# Patient Record
Sex: MALE | Race: BLACK OR AFRICAN AMERICAN | NOT HISPANIC OR LATINO | Employment: UNEMPLOYED | URBAN - METROPOLITAN AREA
[De-identification: names, ages, dates, MRNs, and addresses within clinical notes are randomized per-mention and may not be internally consistent; named-entity substitution may affect disease eponyms.]

---

## 2024-01-30 ENCOUNTER — PHARMACY VISIT (OUTPATIENT)
Dept: PHARMACY | Facility: CLINIC | Age: 39
End: 2024-01-30
Payer: COMMERCIAL

## 2024-01-30 ENCOUNTER — HOSPITAL ENCOUNTER (EMERGENCY)
Facility: HOSPITAL | Age: 39
Discharge: HOME | End: 2024-01-30
Attending: EMERGENCY MEDICINE
Payer: COMMERCIAL

## 2024-01-30 VITALS
BODY MASS INDEX: 22.4 KG/M2 | RESPIRATION RATE: 16 BRPM | DIASTOLIC BLOOD PRESSURE: 79 MMHG | OXYGEN SATURATION: 100 % | WEIGHT: 169 LBS | TEMPERATURE: 98.6 F | HEIGHT: 73 IN | SYSTOLIC BLOOD PRESSURE: 108 MMHG | HEART RATE: 80 BPM

## 2024-01-30 DIAGNOSIS — M54.9 BACK PAIN, UNSPECIFIED BACK LOCATION, UNSPECIFIED BACK PAIN LATERALITY, UNSPECIFIED CHRONICITY: Primary | ICD-10-CM

## 2024-01-30 PROCEDURE — RXMED WILLOW AMBULATORY MEDICATION CHARGE

## 2024-01-30 PROCEDURE — 2500000001 HC RX 250 WO HCPCS SELF ADMINISTERED DRUGS (ALT 637 FOR MEDICARE OP): Performed by: STUDENT IN AN ORGANIZED HEALTH CARE EDUCATION/TRAINING PROGRAM

## 2024-01-30 PROCEDURE — 99284 EMERGENCY DEPT VISIT MOD MDM: CPT | Performed by: EMERGENCY MEDICINE

## 2024-01-30 PROCEDURE — 99283 EMERGENCY DEPT VISIT LOW MDM: CPT | Performed by: EMERGENCY MEDICINE

## 2024-01-30 PROCEDURE — 2500000005 HC RX 250 GENERAL PHARMACY W/O HCPCS: Performed by: STUDENT IN AN ORGANIZED HEALTH CARE EDUCATION/TRAINING PROGRAM

## 2024-01-30 RX ORDER — METHOCARBAMOL 500 MG/1
500 TABLET, FILM COATED ORAL ONCE
Status: COMPLETED | OUTPATIENT
Start: 2024-01-30 | End: 2024-01-30

## 2024-01-30 RX ORDER — IBUPROFEN 600 MG/1
600 TABLET ORAL EVERY 6 HOURS PRN
Qty: 30 TABLET | Refills: 0 | Status: SHIPPED | OUTPATIENT
Start: 2024-01-30

## 2024-01-30 RX ORDER — ACETAMINOPHEN 325 MG/1
975 TABLET ORAL ONCE
Status: COMPLETED | OUTPATIENT
Start: 2024-01-30 | End: 2024-01-30

## 2024-01-30 RX ORDER — LIDOCAINE 560 MG/1
1 PATCH PERCUTANEOUS; TOPICAL; TRANSDERMAL ONCE
Status: DISCONTINUED | OUTPATIENT
Start: 2024-01-30 | End: 2024-01-30 | Stop reason: HOSPADM

## 2024-01-30 RX ORDER — IBUPROFEN 600 MG/1
600 TABLET ORAL ONCE
Status: COMPLETED | OUTPATIENT
Start: 2024-01-30 | End: 2024-01-30

## 2024-01-30 RX ORDER — ACETAMINOPHEN 325 MG/1
650 TABLET ORAL EVERY 6 HOURS PRN
Qty: 30 TABLET | Refills: 0 | Status: SHIPPED | OUTPATIENT
Start: 2024-01-30

## 2024-01-30 RX ADMIN — METHOCARBAMOL 500 MG: 500 TABLET ORAL at 11:56

## 2024-01-30 RX ADMIN — LIDOCAINE 1 PATCH: 4 PATCH TOPICAL at 11:43

## 2024-01-30 RX ADMIN — ACETAMINOPHEN 975 MG: 325 TABLET ORAL at 11:47

## 2024-01-30 RX ADMIN — IBUPROFEN 600 MG: 600 TABLET, FILM COATED ORAL at 11:43

## 2024-01-30 ASSESSMENT — PAIN - FUNCTIONAL ASSESSMENT: PAIN_FUNCTIONAL_ASSESSMENT: 0-10

## 2024-01-30 ASSESSMENT — PAIN SCALES - GENERAL
PAINLEVEL_OUTOF10: 10 - WORST POSSIBLE PAIN
PAINLEVEL_OUTOF10: 9

## 2024-01-30 ASSESSMENT — LIFESTYLE VARIABLES
EVER FELT BAD OR GUILTY ABOUT YOUR DRINKING: NO
EVER HAD A DRINK FIRST THING IN THE MORNING TO STEADY YOUR NERVES TO GET RID OF A HANGOVER: NO
HAVE PEOPLE ANNOYED YOU BY CRITICIZING YOUR DRINKING: NO
REASON UNABLE TO ASSESS: NO
HAVE YOU EVER FELT YOU SHOULD CUT DOWN ON YOUR DRINKING: NO

## 2024-01-30 ASSESSMENT — COLUMBIA-SUICIDE SEVERITY RATING SCALE - C-SSRS
1. IN THE PAST MONTH, HAVE YOU WISHED YOU WERE DEAD OR WISHED YOU COULD GO TO SLEEP AND NOT WAKE UP?: NO
2. HAVE YOU ACTUALLY HAD ANY THOUGHTS OF KILLING YOURSELF?: NO
6. HAVE YOU EVER DONE ANYTHING, STARTED TO DO ANYTHING, OR PREPARED TO DO ANYTHING TO END YOUR LIFE?: NO

## 2024-01-30 NOTE — ED PROVIDER NOTES
I performed a history and physical examination of Christiana Estevez and discussed his management with Dr. Kim.  I agree with the history, physical, assessment, and plan of care, with the following exceptions: None    I was present for the following procedures: None  Time Spent in Critical Care of the patient: None  Time spent in discussions with the patient and family: 15    No red flags on H&P.  Patient denied numbness in bilateral lower extremities, weakness in bilateral lower extremities, urinary or fecal incontinence, urinary retention, recent unexpected weight loss, cancer, fevers or chills.  Patient denies history of IV drug use (smokes crack, no IVDU)  On physical exam 5/5 strength in bilateral lower extremities, +sensation intact to light touch in bilateral lower extremities, no saddle anesthesia, no rashes or lesions to lower back, ambulatory in the ED,     Ephraim Ruiz MD MPH       Ephraim Ruiz MD MPH  01/30/24 9600

## 2024-01-30 NOTE — ED PROVIDER NOTES
HPI   Chief Complaint   Patient presents with    Back Pain       Patient is a 38-year-old male with past medical history of schizo for with long-acting injectables, anxiety, alcohol use disorder here with acute onset back pain while walking down steps at his group home.  He denies any known injury, denies that this is ever happened before, states that the pain improves and worsens with certain movements, denies any focal weakness, numbness, paresthesias, urinary retention or incontinence, change in bowel symptoms, saddle anesthesia, midline back pain, urinary symptoms, abdominal pain.  Denies any IV drug use or blood thinner use.  Denies any headaches, vision changes, neck pain or stiffness.      History provided by:  Patient and medical records   used: No                        [place] Coma Scale Score: 15                  Patient History   No past medical history on file.  No past surgical history on file.  No family history on file.  Social History     Tobacco Use    Smoking status: Not on file    Smokeless tobacco: Not on file   Substance Use Topics    Alcohol use: Not on file    Drug use: Not on file       Physical Exam   ED Triage Vitals [01/30/24 0842]   Temperature Heart Rate Respirations BP   36.6 °C (97.9 °F) 88 17 110/72      Pulse Ox Temp Source Heart Rate Source Patient Position   97 % Temporal -- --      BP Location FiO2 (%)     -- --       Physical Exam  Constitutional:       Appearance: Normal appearance.   HENT:      Head: Normocephalic and atraumatic.      Right Ear: External ear normal.      Left Ear: External ear normal.      Nose: Nose normal.      Mouth/Throat:      Mouth: Mucous membranes are moist.      Pharynx: Oropharynx is clear.   Eyes:      Extraocular Movements: Extraocular movements intact.      Conjunctiva/sclera: Conjunctivae normal.      Pupils: Pupils are equal, round, and reactive to light.   Cardiovascular:      Rate and Rhythm: Normal rate and regular rhythm.       Pulses: Normal pulses.      Heart sounds: Normal heart sounds.   Pulmonary:      Effort: Pulmonary effort is normal.      Breath sounds: Normal breath sounds.   Abdominal:      Palpations: Abdomen is soft.      Tenderness: There is no abdominal tenderness.   Musculoskeletal:      Cervical back: Normal range of motion and neck supple.      Comments: Paraspinal tenderness without midline tenderness step-offs or deformities over the L-spine   Skin:     General: Skin is warm and dry.      Capillary Refill: Capillary refill takes less than 2 seconds.   Neurological:      General: No focal deficit present.      Mental Status: He is alert and oriented to person, place, and time.      Cranial Nerves: No cranial nerve deficit.      Sensory: No sensory deficit.      Motor: No weakness.      Coordination: Coordination normal.      Gait: Gait normal.   Psychiatric:         Mood and Affect: Mood normal.         Behavior: Behavior normal.         ED Course & MDM        Medical Decision Making  38-year-old male here with acute onset back pain while walking.  Presents hemodynamically stable, no acute distress, mentating appropriate, afebrile and saturating well on room air.  Physical exam notable for an overall well-appearing male, no neurologic sequelae, tenderness over the paraspinal muscles without any midline symptoms, he does not have risk factors outside of smoking for aortic pathology, no urinary symptoms to suggest pyelonephritis or nephrolithiasis, no abdominal tenderness or pain, given the locality of his symptoms, lack of IV drug use, cancer and his age, I do not suspect cauda equina, epidural abscess or other emergent pathology.  Given nonopioid analgesia with some improvement, able to ambulate without deficit, given nonopioid prescriptions, follow-up, discharged.    Risk  Prescription drug management.        Procedure  Procedures     Juan Rosen MD  Resident  01/30/24 3889

## 2024-01-30 NOTE — PROGRESS NOTES
Christiana Estevez is a 38 y.o. male on day 0 of admission presenting with No Principal Problem: There is no principal problem currently on the Problem List. Please update the Problem List and refresh..    Assessment/Plan   Active Problems:    Back pain  Pt discharging from ED for back pain to group home. Met with Pt. He provided this SW a number from a handwritten note for the group [phone]. This number rang busy. All numbers listedfor Pt were incorrect. Pt verbally confirmed number may be [phone]. Call reeived by staff member Dinorah Rodriguez. She said she sent Pt to the ER today due to back pain. She asked Pt's discharging medicaitons be filled at pharmacy so they didn't have to make an additional trip. Pt agreed to wait. LAUREN obtained medication in [facility] and gave report to MARQUEZ [place] Pt is waiting for meds. Warm meal provided to Pt while he waited.    Medication delivered bedside and Pt accompanied to ride.   LAUREN called and confirmed Pt had arrived. Dinorah stated pt got his medication at 3pm. No further needs.     DUDLEY Bacon

## 2024-01-30 NOTE — ED TRIAGE NOTES
Pt sent to ED from a group home with c/o back pain that started while walking down the stairs, denies injury

## 2024-10-06 ENCOUNTER — HOSPITAL ENCOUNTER (EMERGENCY)
Facility: HOSPITAL | Age: 39
Discharge: HOME | End: 2024-10-06
Attending: STUDENT IN AN ORGANIZED HEALTH CARE EDUCATION/TRAINING PROGRAM
Payer: COMMERCIAL

## 2024-10-06 VITALS
HEIGHT: 73 IN | OXYGEN SATURATION: 96 % | RESPIRATION RATE: 16 BRPM | WEIGHT: 159 LBS | SYSTOLIC BLOOD PRESSURE: 111 MMHG | BODY MASS INDEX: 21.07 KG/M2 | DIASTOLIC BLOOD PRESSURE: 76 MMHG | TEMPERATURE: 98 F | HEART RATE: 90 BPM

## 2024-10-06 DIAGNOSIS — M54.50 ACUTE RIGHT-SIDED LOW BACK PAIN WITHOUT SCIATICA: Primary | ICD-10-CM

## 2024-10-06 LAB
APPEARANCE UR: CLEAR
BILIRUB UR STRIP.AUTO-MCNC: NEGATIVE MG/DL
COLOR UR: COLORLESS
GLUCOSE UR STRIP.AUTO-MCNC: NORMAL MG/DL
HOLD SPECIMEN: NORMAL
KETONES UR STRIP.AUTO-MCNC: NEGATIVE MG/DL
LEUKOCYTE ESTERASE UR QL STRIP.AUTO: NEGATIVE
NITRITE UR QL STRIP.AUTO: NEGATIVE
PH UR STRIP.AUTO: 6.5 [PH]
PROT UR STRIP.AUTO-MCNC: NEGATIVE MG/DL
RBC # UR STRIP.AUTO: NEGATIVE /UL
SP GR UR STRIP.AUTO: 1
UROBILINOGEN UR STRIP.AUTO-MCNC: NORMAL MG/DL

## 2024-10-06 PROCEDURE — 99284 EMERGENCY DEPT VISIT MOD MDM: CPT | Performed by: STUDENT IN AN ORGANIZED HEALTH CARE EDUCATION/TRAINING PROGRAM

## 2024-10-06 PROCEDURE — 2500000001 HC RX 250 WO HCPCS SELF ADMINISTERED DRUGS (ALT 637 FOR MEDICARE OP): Mod: SE | Performed by: STUDENT IN AN ORGANIZED HEALTH CARE EDUCATION/TRAINING PROGRAM

## 2024-10-06 PROCEDURE — 81003 URINALYSIS AUTO W/O SCOPE: CPT | Performed by: STUDENT IN AN ORGANIZED HEALTH CARE EDUCATION/TRAINING PROGRAM

## 2024-10-06 PROCEDURE — 2500000005 HC RX 250 GENERAL PHARMACY W/O HCPCS: Mod: SE | Performed by: STUDENT IN AN ORGANIZED HEALTH CARE EDUCATION/TRAINING PROGRAM

## 2024-10-06 PROCEDURE — 99283 EMERGENCY DEPT VISIT LOW MDM: CPT | Performed by: STUDENT IN AN ORGANIZED HEALTH CARE EDUCATION/TRAINING PROGRAM

## 2024-10-06 RX ORDER — LIDOCAINE 560 MG/1
1 PATCH PERCUTANEOUS; TOPICAL; TRANSDERMAL ONCE
Status: DISCONTINUED | OUTPATIENT
Start: 2024-10-06 | End: 2024-10-06 | Stop reason: HOSPADM

## 2024-10-06 RX ORDER — ACETAMINOPHEN 500 MG
1000 TABLET ORAL EVERY 6 HOURS PRN
Qty: 30 TABLET | Refills: 0 | Status: SHIPPED | OUTPATIENT
Start: 2024-10-06 | End: 2024-10-16

## 2024-10-06 RX ORDER — METHOCARBAMOL 500 MG/1
500 TABLET, FILM COATED ORAL ONCE
Status: COMPLETED | OUTPATIENT
Start: 2024-10-06 | End: 2024-10-06

## 2024-10-06 RX ORDER — IBUPROFEN 600 MG/1
600 TABLET ORAL ONCE
Status: COMPLETED | OUTPATIENT
Start: 2024-10-06 | End: 2024-10-06

## 2024-10-06 RX ORDER — METHOCARBAMOL 500 MG/1
500 TABLET, FILM COATED ORAL 4 TIMES DAILY PRN
Qty: 20 TABLET | Refills: 0 | Status: SHIPPED | OUTPATIENT
Start: 2024-10-06 | End: 2024-10-16

## 2024-10-06 RX ORDER — ACETAMINOPHEN 325 MG/1
975 TABLET ORAL ONCE
Status: COMPLETED | OUTPATIENT
Start: 2024-10-06 | End: 2024-10-06

## 2024-10-06 RX ORDER — IBUPROFEN 400 MG/1
400 TABLET ORAL EVERY 6 HOURS PRN
Qty: 30 TABLET | Refills: 0 | Status: SHIPPED | OUTPATIENT
Start: 2024-10-06 | End: 2024-10-14

## 2024-10-06 RX ADMIN — METHOCARBAMOL 500 MG: 500 TABLET ORAL at 10:40

## 2024-10-06 RX ADMIN — ACETAMINOPHEN 975 MG: 325 TABLET ORAL at 10:40

## 2024-10-06 RX ADMIN — LIDOCAINE 1 PATCH: 4 PATCH TOPICAL at 10:40

## 2024-10-06 RX ADMIN — IBUPROFEN 600 MG: 600 TABLET, FILM COATED ORAL at 10:40

## 2024-10-06 ASSESSMENT — PAIN - FUNCTIONAL ASSESSMENT: PAIN_FUNCTIONAL_ASSESSMENT: 0-10

## 2024-10-06 ASSESSMENT — COLUMBIA-SUICIDE SEVERITY RATING SCALE - C-SSRS
1. IN THE PAST MONTH, HAVE YOU WISHED YOU WERE DEAD OR WISHED YOU COULD GO TO SLEEP AND NOT WAKE UP?: NO
6. HAVE YOU EVER DONE ANYTHING, STARTED TO DO ANYTHING, OR PREPARED TO DO ANYTHING TO END YOUR LIFE?: NO
2. HAVE YOU ACTUALLY HAD ANY THOUGHTS OF KILLING YOURSELF?: NO

## 2024-10-06 ASSESSMENT — PAIN DESCRIPTION - LOCATION: LOCATION: BACK

## 2024-10-06 ASSESSMENT — PAIN SCALES - GENERAL
PAINLEVEL_OUTOF10: 10 - WORST POSSIBLE PAIN
PAINLEVEL_OUTOF10: 4

## 2024-10-06 NOTE — ED PROVIDER NOTES
Emergency Department Provider Note        History of Present Illness     History provided by: Patient  Limitations to History: None  External Records Reviewed with Brief Summary:  ED note from 01/30/24 where the patient has similar complaints and was discharged with non-opioid analgesics.     HPI:  Christiana Estevez is a 39 y.o. male with a PMH of schizophrenia, anxiety, and alcohol use disorder here for acute back pain. He states that that pain has been going on for about a day. He states that he believes that the pain started from walking in a pair of flat shoes recently. When asked where his back hurts specifically, he states that his entire back hurts, but on further questioning by my Attending, indicates it is mainly on the right side. He recalls no recent falls or traumatic injuries to his back. He denies any recent alcohol or drug usage. He denies any recent illnesses and states that he has no underlying immunocompromise. He denies any recent changes to medications. He denies urinary or rectal incontinence, pain with urination or discoloration of urination. When asked about his previous episode of back pain, he said this episode is probably worse.    Physical Exam   Triage vitals:  T 36.7 °C (98 °F)  HR 90  /76  RR 16  O2 96 % None (Room air)    Physical Exam  HENT:      Head: Normocephalic and atraumatic.   Eyes:      Extraocular Movements: Extraocular movements intact.      Pupils: Pupils are equal, round, and reactive to light.   Cardiovascular:      Rate and Rhythm: Normal rate and regular rhythm.   Pulmonary:      Effort: Pulmonary effort is normal.      Breath sounds: Normal breath sounds.   Abdominal:      General: Abdomen is flat. Bowel sounds are normal.      Palpations: Abdomen is soft.   Musculoskeletal:         General: Normal range of motion.   Skin:     General: Skin is warm and dry.   Neurological:      General: No focal deficit present.      Mental Status: He is alert.      Cranial  Nerves: Cranial nerves 2-12 are intact.      Sensory: Sensation is intact.      Motor: Motor function is intact. No weakness or tremor.      Coordination: Coordination is intact. Coordination normal. Finger-Nose-Finger Test and Heel to Shin Test normal.      Gait: Gait is intact. Gait normal.      Deep Tendon Reflexes: Reflexes are normal and symmetric.      Reflex Scores:       Patellar reflexes are 2+ on the right side and 2+ on the left side.       Achilles reflexes are 2+ on the right side and 2+ on the left side.     Comments: 5/5 strength in bilateral upper and lower extremities   Psychiatric:         Mood and Affect: Mood normal.         Behavior: Behavior normal.          Medical Decision Making & ED Course   Medical Decision Makin y.o. male presenting for non-focal, non-specific acute back pain. He is hemodynamically stable, in no acute distress, mentating appropriately, afebrile, and saturating well on room air. He recalls no inciting events and is clinically well-appearing. He does have some tenderness over his right lower paraspinal muscles, but no midline tenderness. His neurological exam shows no obvious weakness in an extremities and he is able to ambulate without difficulty. His urine does not have any alarming findings and appears clear. Given that he has no significant findings on his neurological exam and that he is able to ambulate and move his limbs without difficulty, there is no need for any imaging at this time, no red flag symptoms, not concerned for cauda equina, not concerned for AAA or other major etiology. Analgesics including acetaminophen, ibuprofen, and Percocet will be given. A lidocaine patch will also be placed.    On re-evaluation, the patient feels that his pain is much better controlled. His neurological examination is unchanged from his previous. He was informed that he will be given medications for symptom management.  ----    Differential diagnoses considered include  but are not limited to: muscular strain, disc herniation, undisclosed fracture, epidural abscess, pyelonephritis, nephrolithiasis    Chronic conditions affecting the patient's care: As documented above in [facility]    The patient was discussed with the following consultants/services: None    Care Considerations: As documented above in [facility]    ED Course:  ED Course as of 10/06/24 1203   Sun Oct 06, 2024   1030 Patient has right lower back pain without CVA tenderness, does have worse pain in the back when raise right leg, but no electrical shooting pain, generally pushes away from radiculopathy and therefore not a positive straight leg test. No midline tenderness, very mild right lower back tenderness near level of pelvic brim. No urinary symptoms, normal gait, normal 5/5 strength with flexion/extension hip, knee, ankle. Normal sensation in all dermatomes, normal 2+ reflexes patella and achilles bilaterally [JH]   1107 Nitrite, Urine: NEGATIVE  Reassuring, negative [JH]      ED Course User Index  [JH] Justin Méndez MD         Diagnoses as of 10/06/24 1203   Acute right-sided low back pain without sciatica     Disposition   As a result of the work-up, the patient was discharged home.  he was informed of his diagnosis and instructed to come back with any concerns or worsening of condition.  he and was agreeable to the plan as discussed above.  he was given the opportunity to ask questions.  All of the patient's questions were answered.    Marika Cordero, MS-IV    Patient seen and discussed with ED attending physician.    MARIKA CORDERO  Emergency Medicine     Marika Cordero  10/06/24 1203       Justin Méndez MD  10/06/24 6085

## 2024-10-06 NOTE — DISCHARGE INSTRUCTIONS
You were seen in the ED for an acute episode of back pain. Given that you had no alarming symptoms, there was no need for spinal imaging. The pain was treated as non-specific muscular pain and you were given analgesics, and you will be given a prescription for Tylenol, Robaxin, and ibuprofen as well. If the pain worsens or you experience any new symptoms such as weakness, numbness, tingling, or incontinence, return to the emergency department immediately for further evaluation.